# Patient Record
Sex: FEMALE | Race: WHITE | NOT HISPANIC OR LATINO | Employment: UNEMPLOYED | URBAN - METROPOLITAN AREA
[De-identification: names, ages, dates, MRNs, and addresses within clinical notes are randomized per-mention and may not be internally consistent; named-entity substitution may affect disease eponyms.]

---

## 2023-06-26 RX ORDER — BISOPROLOL FUMARATE 5 MG/1
TABLET, FILM COATED ORAL
COMMUNITY
Start: 2022-10-11 | End: 2023-06-27 | Stop reason: SDUPTHER

## 2023-06-26 ASSESSMENT — ENCOUNTER SYMPTOMS
WEAKNESS: 0
WHEEZING: 0
DIZZINESS: 0
DIFFICULTY URINATING: 0
FATIGUE: 0
DIARRHEA: 0
POLYDIPSIA: 0
EYE DISCHARGE: 0
CHEST TIGHTNESS: 0
SHORTNESS OF BREATH: 0
APPETITE CHANGE: 0
HEMATURIA: 0
POLYPHAGIA: 0
COUGH: 0
VOMITING: 0
CONFUSION: 0
EYE PAIN: 0
NERVOUS/ANXIOUS: 0
PALPITATIONS: 0
FREQUENCY: 0
NUMBNESS: 0
CHOKING: 0
CONSTIPATION: 0
ABDOMINAL DISTENTION: 0
COLOR CHANGE: 0
ANAL BLEEDING: 0
FEVER: 0
ARTHRALGIAS: 0
TREMORS: 0
SLEEP DISTURBANCE: 0
JOINT SWELLING: 0
HEADACHES: 0
MYALGIAS: 0
NAUSEA: 0
CHILLS: 0
FACIAL SWELLING: 0
SORE THROAT: 0
ABDOMINAL PAIN: 0

## 2023-06-26 NOTE — PROGRESS NOTES
"Subjective   Patient ID: Tena Ross is a 63 y.o. female with a history of pneumonia x4, right sided pneumothorax x2 (in 2021 and 2022), s/p VATS wedge resection (bullectomy) with apical pleurectomy and mechanical pleurodesis in October 2022, osteoporosis, the patient was recently diagnosed with osteoporosis.  She was seen by seasonal allergy and tachycardia who presents for Follow-up.    HPI the patient was recently seen by rheumatology and was started on alendronate which she tolerates well.  Her blood pressure is well controlled.  Denies shortness of breath, wheezing or difficulty breathing.  She does not exercise but walks daily.  Needs refill on her bisoprolol today.    Review of Systems   Constitutional:  Negative for appetite change, chills, fatigue and fever.   HENT:  Negative for congestion, ear pain, facial swelling, hearing loss, nosebleeds and sore throat.    Eyes:  Negative for pain, discharge and visual disturbance.   Respiratory:  Negative for cough, choking, chest tightness, shortness of breath and wheezing.    Cardiovascular:  Negative for chest pain, palpitations and leg swelling.   Gastrointestinal:  Negative for abdominal distention, abdominal pain, anal bleeding, constipation, diarrhea, nausea and vomiting.   Endocrine: Negative for cold intolerance, heat intolerance, polydipsia, polyphagia and polyuria.   Genitourinary:  Negative for difficulty urinating, frequency, hematuria and urgency.   Musculoskeletal:  Negative for arthralgias, gait problem, joint swelling and myalgias.   Skin:  Negative for color change and rash.   Neurological:  Negative for dizziness, tremors, syncope, weakness, numbness and headaches.   Psychiatric/Behavioral:  Negative for behavioral problems, confusion, sleep disturbance and suicidal ideas. The patient is not nervous/anxious.        Objective   /80   Temp 36 °C (96.8 °F)   Ht 1.499 m (4' 11\")   Wt 49 kg (108 lb)   BMI 21.81 kg/m²     Physical " Exam  Constitutional:       General: She is not in acute distress.     Appearance: Normal appearance.   HENT:      Head: Normocephalic and atraumatic.      Nose: Nose normal.   Eyes:      Extraocular Movements: Extraocular movements intact.      Conjunctiva/sclera: Conjunctivae normal.      Pupils: Pupils are equal, round, and reactive to light.   Cardiovascular:      Rate and Rhythm: Normal rate and regular rhythm.      Pulses: Normal pulses.      Heart sounds: Normal heart sounds.   Pulmonary:      Effort: Pulmonary effort is normal.      Breath sounds: Normal breath sounds.   Abdominal:      General: Bowel sounds are normal.      Palpations: Abdomen is soft.   Musculoskeletal:         General: Normal range of motion.      Cervical back: Normal range of motion and neck supple.   Neurological:      General: No focal deficit present.      Mental Status: She is alert and oriented to person, place, and time.   Psychiatric:         Mood and Affect: Mood normal.         Behavior: Behavior normal.         Thought Content: Thought content normal.         Judgment: Judgment normal.         Assessment/Plan        High blood pressure   Blood pressure well controlled  Continue bisoprolol 5 mg daily  No added salt diet, do not add salt to your food  Try to exercise every other day for 30 minutes  Monitor your blood pressure at home     tachycardia  Stable  On bisoprolol 5 mg daily    Right sided pneumothorax x2 (in 2021 and 10/2022)  Had VATS wedge resection (bullectomy), with apical pleurectomy and mechanical pleurodesis in October 2022    History of pneumonia   Stable  Pneumovax is up to date  Will have Prevnar 20 in October      seasonal allergies  Stable     Osteoporosis   On Alendronate 70 mg once a week  Taking Calcium 100 mg daily and Vitamin D 1200 U  Follow up with Rheumatology Dr. Daily      Health maintenance  Mammogram 10/2022  DEXA bone density 11/2022  Colonoscopy, she will consider    We obtained blood work  I  will call with results

## 2023-06-27 ENCOUNTER — OFFICE VISIT (OUTPATIENT)
Dept: PRIMARY CARE | Facility: CLINIC | Age: 63
End: 2023-06-27
Payer: MEDICAID

## 2023-06-27 VITALS
SYSTOLIC BLOOD PRESSURE: 128 MMHG | TEMPERATURE: 96.8 F | HEIGHT: 59 IN | BODY MASS INDEX: 21.77 KG/M2 | DIASTOLIC BLOOD PRESSURE: 80 MMHG | WEIGHT: 108 LBS

## 2023-06-27 DIAGNOSIS — J43.9 BULLA OF LUNG (MULTI): ICD-10-CM

## 2023-06-27 DIAGNOSIS — I10 BENIGN ESSENTIAL HYPERTENSION: ICD-10-CM

## 2023-06-27 DIAGNOSIS — E55.9 MILD VITAMIN D DEFICIENCY: ICD-10-CM

## 2023-06-27 DIAGNOSIS — R00.0 TACHYCARDIA: Primary | ICD-10-CM

## 2023-06-27 DIAGNOSIS — E78.5 DYSLIPIDEMIA: ICD-10-CM

## 2023-06-27 PROBLEM — M81.0 OSTEOPOROSIS: Status: ACTIVE | Noted: 2023-06-27

## 2023-06-27 PROBLEM — J93.9 PNEUMOTHORAX: Status: ACTIVE | Noted: 2023-06-27

## 2023-06-27 PROBLEM — J30.2 SEASONAL ALLERGIES: Status: ACTIVE | Noted: 2023-06-27

## 2023-06-27 PROCEDURE — 99214 OFFICE O/P EST MOD 30 MIN: CPT | Performed by: PHYSICIAN ASSISTANT

## 2023-06-27 PROCEDURE — 3079F DIAST BP 80-89 MM HG: CPT | Performed by: PHYSICIAN ASSISTANT

## 2023-06-27 PROCEDURE — 85025 COMPLETE CBC W/AUTO DIFF WBC: CPT | Performed by: PHYSICIAN ASSISTANT

## 2023-06-27 PROCEDURE — 3074F SYST BP LT 130 MM HG: CPT | Performed by: PHYSICIAN ASSISTANT

## 2023-06-27 PROCEDURE — 82306 VITAMIN D 25 HYDROXY: CPT | Performed by: PHYSICIAN ASSISTANT

## 2023-06-27 PROCEDURE — 80053 COMPREHEN METABOLIC PANEL: CPT | Performed by: PHYSICIAN ASSISTANT

## 2023-06-27 PROCEDURE — 1036F TOBACCO NON-USER: CPT | Performed by: PHYSICIAN ASSISTANT

## 2023-06-27 PROCEDURE — 80061 LIPID PANEL: CPT | Performed by: PHYSICIAN ASSISTANT

## 2023-06-27 RX ORDER — ALENDRONATE SODIUM 70 MG/1
TABLET ORAL
COMMUNITY
Start: 2023-06-08 | End: 2024-02-01 | Stop reason: SDUPTHER

## 2023-06-27 RX ORDER — BISOPROLOL FUMARATE 5 MG/1
5 TABLET, FILM COATED ORAL DAILY
Qty: 90 TABLET | Refills: 3 | Status: SHIPPED | OUTPATIENT
Start: 2023-06-27

## 2023-06-30 DIAGNOSIS — E55.9 MILD VITAMIN D DEFICIENCY: Primary | ICD-10-CM

## 2023-06-30 RX ORDER — ERGOCALCIFEROL 1.25 MG/1
50000 CAPSULE ORAL
Qty: 6 CAPSULE | Refills: 0 | Status: SHIPPED | OUTPATIENT
Start: 2023-06-30 | End: 2023-08-11

## 2023-07-11 DIAGNOSIS — J43.9 BULLA OF LUNG (MULTI): ICD-10-CM

## 2023-07-11 DIAGNOSIS — Z23 NEED FOR PROPHYLACTIC VACCINATION AGAINST STREPTOCOCCUS PNEUMONIAE (PNEUMOCOCCUS): ICD-10-CM

## 2023-07-11 DIAGNOSIS — J93.11 PRIMARY SPONTANEOUS PNEUMOTHORAX: Primary | ICD-10-CM

## 2024-02-01 ENCOUNTER — TELEMEDICINE (OUTPATIENT)
Dept: RHEUMATOLOGY | Facility: CLINIC | Age: 64
End: 2024-02-01
Payer: MEDICAID

## 2024-02-01 DIAGNOSIS — M81.0 AGE RELATED OSTEOPOROSIS, UNSPECIFIED PATHOLOGICAL FRACTURE PRESENCE: Primary | ICD-10-CM

## 2024-02-01 PROCEDURE — 99215 OFFICE O/P EST HI 40 MIN: CPT | Performed by: STUDENT IN AN ORGANIZED HEALTH CARE EDUCATION/TRAINING PROGRAM

## 2024-02-01 RX ORDER — ALENDRONATE SODIUM 70 MG/1
TABLET ORAL
Qty: 12 TABLET | Refills: 3 | Status: SHIPPED | OUTPATIENT
Start: 2024-02-01

## 2024-02-01 NOTE — PROGRESS NOTES
"Subjective   Patient ID: Tena Caballero is a 64 y.o. female who presents for No chief complaint on file..  HPI:   female referred for OP on 11/2022 DEXA, [place] speaking. Poor dental hygiene, but dentist Dr Virgil Holcomb cleared her for fosamax, which was started 1/2023 without issue, also on daily ca and vit d  Objective   There were no vitals taken for this visit.         Lab Results   Component Value Date    WBC 6.2 11/29/2022    HGB 14.0 11/29/2022    HCT 43.2 11/29/2022     11/29/2022    ALT 9 11/29/2022    AST 11 11/29/2022    CREATININE 0.84 11/29/2022          No results found for: \"ANANP\", \"ANATITERADD\", \"ANACO\", \"ANAPATTRN\", \"ANPA2\", \"FANAP\", \"ANATITER\", \"ANAT2\", \"ROSA\", \"CDCANA\", \"DSDN\", \"EMPSMRNP\", \"SCLN\", \"SCLABQ\", \"SCIB\", \"CTIB\", \"IKWVKO76\", \"KRBVWO96\", \"ASSB\", \"SSBB\", \"C3\", \"C4\", \"UAMICCOMM\", \"UTPCR\", \"ANTIRIBO\", \"ACEN\", \"SEDRATE\", \"NONUHFIRE\", \"POCESR\", \"CRP\", \"RF\", \"CCPIGGQUAL\"\\            There is currently no information documented on the homunculus. Go to the Rheumatology activity and complete the homunculus joint exam.      BI mammo bilateral screening tomosynthesis  MRN: [id number]  Patient Name: TENA CABALLERO     STUDY:  DIGITAL MAMM SCREENING W/ ELI;  12/7/2022 9:45 am     ACCESSION NUMBER(S):  [id number]     ORDERING CLINICIAN:  ROBERT BENYAMINOV     INDICATION:  Baseline screening.     COMPARISON:  None.     FINDINGS:  2D and tomosynthesis images were reviewed at 1 mm slice thickness.     The breast tissue is heterogeneously dense, which may obscure small  masses.   No suspicious masses or calcifications are identified.     This study was interpreted with CAD.     IMPRESSION:  No mammographic evidence of malignancy.     BI-RADS CATEGORY:     Category: 1 - Negative.  Recommendation:  1 Year Screening.  Patient letter sent SNORM      === 11/11/22 ===    BONE DENSITY    Assessment/Plan:    #OP on 11/2022 DEXA  -secondary rueda normal  -Patient counseled on osteoporosis dx. Patient " counseled on necessary vitamin D and calcium supplementation. Patient counseled on importance of stability and balance. Weightbearing exercises, such as walking, encouraged. Patient handout given.  -discussed options for tx: patient wants alendronate  -alendronate started 1/2023; no issue  -sees dentist Dr Aime Holcomb who cleared her for alendronate  -continue alendronate 70 mg weekly. Patient counseled to take on an empty stomach, once a week, with a glass of water. Patient counseled not to lay down or eat for 30 minutes after taking alendronate. Patient counseled on risks of alendronate including but not limited to bone/muscle pain, jaw osteonecrosis, risk of atypical hip fracture if continued for over 5 years, and reflux. Patient counseled to stop bisphosphonates for atleast 3 months before dental work and that to only restart after healing from dental work, and at minimum, 3 months, Patient understanding and aware of risks.   -plan to repeat dexa before next visit, patient counseled to get beginning of jan 2025  -1 year alendronate rxed 2/1/2024  -followup scheduled for 1/30/2025 at 5 pm     Patient counseled to seek medical care if any new or worsening symptoms, urgently if needed.      Note will be sent to primary care doctor       Dragon dictation software was used to dictate this note. Errors may have occurred during dictation that was not intended by the user.

## 2024-11-07 ENCOUNTER — TELEPHONE (OUTPATIENT)
Dept: PRIMARY CARE | Facility: CLINIC | Age: 64
End: 2024-11-07
Payer: MEDICAID

## 2024-11-07 NOTE — TELEPHONE ENCOUNTER
Patient's daughter Cyndi called regarding the appointment notice for her mother wanting to advise that her mother has left the country and won't be coming back.

## 2025-01-30 ENCOUNTER — APPOINTMENT (OUTPATIENT)
Dept: RHEUMATOLOGY | Facility: CLINIC | Age: 65
End: 2025-01-30
Payer: MEDICAID